# Patient Record
Sex: FEMALE | Race: WHITE | URBAN - NONMETROPOLITAN AREA
[De-identification: names, ages, dates, MRNs, and addresses within clinical notes are randomized per-mention and may not be internally consistent; named-entity substitution may affect disease eponyms.]

---

## 2021-03-31 ENCOUNTER — IMPORTED ENCOUNTER (OUTPATIENT)
Dept: URBAN - NONMETROPOLITAN AREA CLINIC 1 | Facility: CLINIC | Age: 66
End: 2021-03-31

## 2021-03-31 PROBLEM — H52.223: Noted: 2021-03-31

## 2021-03-31 PROBLEM — H52.03: Noted: 2021-03-31

## 2021-03-31 PROBLEM — H52.4: Noted: 2021-03-31

## 2021-03-31 PROCEDURE — 92004 COMPRE OPH EXAM NEW PT 1/>: CPT

## 2021-03-31 PROCEDURE — 92015 DETERMINE REFRACTIVE STATE: CPT

## 2021-03-31 NOTE — PATIENT DISCUSSION
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

## 2022-04-15 ASSESSMENT — VISUAL ACUITY
OS_CC: 20/20
OD_CC: 20/20
OU_CC: 20/20

## 2022-04-15 ASSESSMENT — TONOMETRY
OD_IOP_MMHG: 18
OS_IOP_MMHG: 18